# Patient Record
Sex: MALE | Race: WHITE | NOT HISPANIC OR LATINO | Employment: FULL TIME | URBAN - METROPOLITAN AREA
[De-identification: names, ages, dates, MRNs, and addresses within clinical notes are randomized per-mention and may not be internally consistent; named-entity substitution may affect disease eponyms.]

---

## 2018-09-26 ENCOUNTER — TRANSCRIBE ORDERS (OUTPATIENT)
Dept: ADMINISTRATIVE | Facility: HOSPITAL | Age: 53
End: 2018-09-26

## 2018-09-26 DIAGNOSIS — E04.1 THYROID CYST: Primary | ICD-10-CM

## 2020-09-23 ENCOUNTER — HOSPITAL ENCOUNTER (OUTPATIENT)
Dept: GENERAL RADIOLOGY | Facility: HOSPITAL | Age: 55
Discharge: HOME | End: 2020-09-23
Admitting: FAMILY MEDICINE

## 2020-09-23 ENCOUNTER — TRANSCRIBE ORDERS (OUTPATIENT)
Dept: ADMINISTRATIVE | Facility: HOSPITAL | Age: 55
End: 2020-09-23

## 2020-09-23 DIAGNOSIS — M54.12 CERVICAL RADICULAR PAIN: ICD-10-CM

## 2020-09-23 DIAGNOSIS — M54.12 CERVICAL RADICULAR PAIN: Primary | ICD-10-CM

## 2020-09-23 PROCEDURE — 72040 X-RAY EXAM NECK SPINE 2-3 VW: CPT

## 2020-09-30 ENCOUNTER — TRANSCRIBE ORDERS (OUTPATIENT)
Dept: ADMINISTRATIVE | Facility: HOSPITAL | Age: 55
End: 2020-09-30

## 2020-09-30 DIAGNOSIS — M54.12 CERVICAL RADICULOPATHY, CHRONIC: Primary | ICD-10-CM

## 2023-10-24 ENCOUNTER — TRANSCRIBE ORDERS (OUTPATIENT)
Dept: ADMINISTRATIVE | Facility: HOSPITAL | Age: 58
End: 2023-10-24
Payer: COMMERCIAL

## 2023-10-24 DIAGNOSIS — M54.12 CERVICAL RADICULOPATHY, CHRONIC: Primary | ICD-10-CM

## 2023-11-01 ENCOUNTER — TRANSCRIBE ORDERS (OUTPATIENT)
Dept: ADMINISTRATIVE | Facility: HOSPITAL | Age: 58
End: 2023-11-01
Payer: COMMERCIAL

## 2023-11-01 DIAGNOSIS — E04.1 THYROID NODULE: Primary | ICD-10-CM

## 2024-01-09 ENCOUNTER — HOSPITAL ENCOUNTER (OUTPATIENT)
Dept: ULTRASOUND IMAGING | Facility: HOSPITAL | Age: 59
Discharge: HOME | End: 2024-01-09
Admitting: FAMILY MEDICINE
Payer: COMMERCIAL

## 2024-01-09 DIAGNOSIS — E04.1 THYROID NODULE: ICD-10-CM

## 2024-01-09 PROCEDURE — 76942 ECHO GUIDE FOR BIOPSY: CPT

## 2024-01-09 RX ORDER — LIDOCAINE HYDROCHLORIDE 10 MG/ML
10 INJECTION, SOLUTION EPIDURAL; INFILTRATION; INTRACAUDAL; PERINEURAL ONCE
Status: COMPLETED | OUTPATIENT
Start: 2024-01-09 | End: 2024-01-09

## 2024-01-09 RX ADMIN — LIDOCAINE HYDROCHLORIDE 10 ML: 10 INJECTION, SOLUTION EPIDURAL; INFILTRATION; INTRACAUDAL; PERINEURAL at 14:54

## 2024-01-09 NOTE — POST-PROCEDURE NOTE
The following procedure was performed: R thyroid FNA    Please see corresponding Radiology report for in detail procedural information. The Radiology report will be dictated shortly, if not done so already. Please see the IR RN note for the information regarding medicines administered if any, kamla-procedural vitals and I/O information.

## 2024-01-09 NOTE — PRE-PROCEDURE NOTE
"[facility]   Vascular Interventional Radiology  History & Physicial        Patient Name:Edwardo Smyth    : 1965    MRN: [id number]    Primary Care Physician: Marsha Uriarte MD    Referring Physician: Marsha Uriarte MD     Date of admission: 2024    Subjective     Reason for Consult: FNA Thyroid R    History of Present Illness     Edwardo Smyth is a 58 y.o. male referred to IR as noted above.      Active Hospital Problems:  There are no active hospital problems to display for this patient.      Personal History     No past medical history on file.    No past surgical history on file.    Family History: His family history is not on file.     Social History: He  has no history on file for tobacco use, alcohol use, and drug use.    Home Medications:       Current Medications:  No current facility-administered medications for this encounter.     Allergies:  Not on File    Review of Systems    IR Procedure pertinent significant findings are mentioned in the PMH and PSH above.    Objective     There were no vitals taken for this visit.     Physical Exam    A&Ox3.   Able to communicate  No Apparent Distress  Average physique   CVS: VS as noted. Chart reviewed. Stable for the procedure.  Respiratory: Non labored breathing. No signs of respiratory compromise.    Result Review      I have personally reviewed the results from the time of this admission to 2024 15:10 EST and agree with these findings.  [x]  Laboratory  []  Microbiology  [x]  Radiology  []  EKG/Telemetry   []  Cardiology/Vascular   []  Pathology  []  Old records  []  Other:    Most notable findings include: As noted:                      CrCl cannot be calculated (No successful lab value found.). No results found for: \"CREATININE\"    No results found for: \"COVID19\"     No results found for: \"PREGTESTUR\", \"PREGSERUM\", \"HCG\", \"HCGQUANT\"     ASA SCALE ASSESSMENT (applicable ONLY if sedation planned):        MALLAMPATI CLASSIFICATION " (applicable ONLY if sedation planned):       Assessment / Plan     Edwardo Smyth is a 58 y.o. male referred to the IR service with above problem.    Plan:   As above.    Notice: The note was created before the performance of the procedure. It might have been left in the pending status and signed off after the procedure. The time stamp on the note may be misleading.    Joseph Solares MD   Vascular Interventional Radiology  01/09/24   3:10 PM EST

## 2024-01-11 LAB — REF LAB TEST METHOD: NORMAL

## 2025-04-08 ENCOUNTER — TRANSCRIBE ORDERS (OUTPATIENT)
Dept: ADMINISTRATIVE | Facility: HOSPITAL | Age: 60
End: 2025-04-08
Payer: COMMERCIAL

## 2025-04-08 DIAGNOSIS — E04.1 THYROID NODULE: Primary | ICD-10-CM

## 2025-07-21 ENCOUNTER — HOSPITAL ENCOUNTER (OUTPATIENT)
Dept: ULTRASOUND IMAGING | Facility: HOSPITAL | Age: 60
Discharge: HOME | End: 2025-07-21
Admitting: STUDENT IN AN ORGANIZED HEALTH CARE EDUCATION/TRAINING PROGRAM
Payer: COMMERCIAL

## 2025-07-21 DIAGNOSIS — E04.1 THYROID NODULE: ICD-10-CM

## 2025-07-21 PROCEDURE — 76942 ECHO GUIDE FOR BIOPSY: CPT

## 2025-07-21 PROCEDURE — 25010000002 LIDOCAINE PF 1% 1 % SOLUTION: Performed by: STUDENT IN AN ORGANIZED HEALTH CARE EDUCATION/TRAINING PROGRAM

## 2025-07-21 RX ORDER — LIDOCAINE HYDROCHLORIDE 10 MG/ML
5 INJECTION, SOLUTION EPIDURAL; INFILTRATION; INTRACAUDAL; PERINEURAL ONCE
Status: COMPLETED | OUTPATIENT
Start: 2025-07-21 | End: 2025-07-21

## 2025-07-21 RX ADMIN — LIDOCAINE HYDROCHLORIDE 5 ML: 10 INJECTION, SOLUTION EPIDURAL; INFILTRATION; INTRACAUDAL; PERINEURAL at 14:03

## 2025-07-21 NOTE — OP NOTE
IR POST OP NOTE    Physician:Yasmani Gill MD      Procedure: Image guided right thyroid nodule biopsy      Pre Op DX: Right thyroid nodule      Post Op DX: Same      Anesthesia: Local only      Findings: Posterior inferior right thyroid nodule.  3 FNA specimens obtained.  The patient tolerated the procedure well.      Complications: No immediate      Provider Signature: Yasmani Gill MD    07/21/25  14:57 EDT

## 2025-07-21 NOTE — PRE-PROCEDURE NOTE
"  [facility]   Interventional Radiology H&P    Patient Name: Edwardo Smyth  : 1965  MRN: [id number]  Primary Care Physician:  Marsha Uriarte MD  Referring Physician: Marsha Uriarte MD  Date of admission: 2025    Subjective   Subjective     HPI:  Edwardo Smyth is a 59 y.o. male with a history of a right thyroid nodule.  Patient presents to interventional radiology for image guided thyroid nodule biopsy.    Review of Systems:   Constitutional no fever,  no weight loss       Otolaryngeal no difficulty swallowing   Cardiovascular no chest pain   Pulmonary no cough, no sputum production   Gastrointestinal no constipation, no diarrhea                         Personal History       Past Medical/Surgical History: No past medical history on file.  No past surgical history on file.    Social History:      Medications:  (Not in a hospital admission)    Current medications:    Current IV drips:  No current facility-administered medications for this encounter.      Allergies:  Not on File    Objective    Objective     Vitals:   BP: ()/()   Arterial Line BP: ()/()       Physical Exam:   Constitutional: Awake, alert, No acute distress    Respiratory: nonlabored respirations         Result Review        Result Review:     No results found for: \"NA\"    No results found for: \"K\"    No results found for: \"CL\"    No results found for: \"PLASMABICARB\"    No results found for: \"BUN\"    No results found for: \"CREATININE\"    No results found for: \"CALCIUM\"        No components found for: \"GLUCOSE.*\"                 Assessment / Plan     Assesment:  59-year-old male with a history of a right thyroid nodule.  Patient presents to interventional radiology for repeat biopsy.      Plan:   Image guided right thyroid nodule biopsy with local anesthesia only.    The risks and benefits of the procedure were discussed with the patient.    Electronically signed by Yasmani Gill MD, 25, 2:56 PM EDT.    "

## 2025-07-23 LAB — REF LAB TEST METHOD: NORMAL
